# Patient Record
(demographics unavailable — no encounter records)

---

## 2025-02-26 NOTE — PHYSICAL EXAM
[Normal Appearance] : normal appearance [Well Groomed] : well groomed [General Appearance - In No Acute Distress] : no acute distress [Edema] : no peripheral edema [Respiration, Rhythm And Depth] : normal respiratory rhythm and effort [Exaggerated Use Of Accessory Muscles For Inspiration] : no accessory muscle use [Abdomen Soft] : soft [Abdomen Tenderness] : non-tender [Costovertebral Angle Tenderness] : no ~M costovertebral angle tenderness [Normal Station and Gait] : the gait and station were normal for the patient's age [] : no rash [No Focal Deficits] : no focal deficits [Oriented To Time, Place, And Person] : oriented to person, place, and time [Affect] : the affect was normal [Mood] : the mood was normal [No Palpable Adenopathy] : no palpable adenopathy [Chaperone Declined] : Patient declined chaperone [de-identified] : deferred for GYN

## 2025-02-26 NOTE — END OF VISIT
[Time Spent: ___ minutes] : I have spent [unfilled] minutes of time on the encounter which excludes teaching and separately reported services. [4. Prevent psychological harm to patient or others] : Reason - Prevent psychological harm to patient or others [FreeTextEntry3] : Patient notes was transcribed by medical scribjaret Marsh  under the supervision of Dr. Vasquez. And I have   reviewed the patient's chart and agree that it aligns with my medical decisions.  no

## 2025-02-26 NOTE — HISTORY OF PRESENT ILLNESS
[Urinary Frequency] : urinary frequency [Nocturia] : nocturia [FreeTextEntry1] : 78-year-old female patient presents for initial evaluation of renal stone.  Patient reports the finding of blood in the urine during her routine blood work and Her PCP Dr Erica Downs sent her for RBUS which revealed left renal stone. She has no flank pain. Denies fever, dysuria, nausea, vomiting or other constitutional symptoms  She takes long walks for activity, and She is retired currently She presents accompanied by her .  All past and present data reviewed 11/2024 RBUS [RR] = normal right kidney, left kidney 6mm stone, no hydronephrosis // Bladder volume = 284cc, PVR= 38cc   11/2024 BMP BUN=19, Cr = 0.7, GFR =73cc  02/2025 UA = Trace blood  [Weak Stream] : no weak stream [Dysuria] : no dysuria

## 2025-02-26 NOTE — ASSESSMENT
[FreeTextEntry1] : 1.  Right  nephrolithiasis -- 6cm (11/2024 US) 2. ?? AMH --by history   Plan: - Discussed UA and Renal Bladder US results with patient  - Discussed the low sensitivity and specificity of US in the investigation of stone disease as compared to CT scan and KUB   - Stone protocol CT now - KUB now - Urine microscopy and urine culture now -- will call patient if clinically indicated   - Return 4-6 weeks  - Patient and her brother have no additional questions or concerns at this time

## 2025-04-01 NOTE — PHYSICAL EXAM
[Normal Appearance] : normal appearance [Well Groomed] : well groomed [General Appearance - In No Acute Distress] : no acute distress [Edema] : no peripheral edema [Respiration, Rhythm And Depth] : normal respiratory rhythm and effort [Exaggerated Use Of Accessory Muscles For Inspiration] : no accessory muscle use [Abdomen Soft] : soft [Abdomen Tenderness] : non-tender [Costovertebral Angle Tenderness] : no ~M costovertebral angle tenderness [Normal Station and Gait] : the gait and station were normal for the patient's age [] : no rash [No Focal Deficits] : no focal deficits [Oriented To Time, Place, And Person] : oriented to person, place, and time [Affect] : the affect was normal [Mood] : the mood was normal [No Palpable Adenopathy] : no palpable adenopathy [Chaperone Declined] : Patient declined chaperone [de-identified] : deferred for GYN

## 2025-04-01 NOTE — ASSESSMENT
[FreeTextEntry1] : 1.  Left nephrolithiasis -- 5-6mm LMP (03/2025 CT and KUB) 2. AMH -- not clinically demonstrated   Plan: - Discussed Urine microscopy, UCx, KUB and CT scan results with patient  - Discussed the very good probability of spontaneously passing stones not more than 4mm in size. However, for every mm above 4 the chance of passing stones is decreased by 10% - Discussed the probability of passing stones also depends on the position of the stone in the kidney. Drinking water or any other clear fluid, physical activity and medications such as flomax can help pass smaller stones  - Discussed the risks and possible complications of renal stones including but not limited to renal colic, UTI and sepsis. - Discussed SDM on the approach to her stone disease, we reviewed Observation vs Left ESWL. Patient and her  are leaning to ESWL but undecided at this time, they will call office with their decision. - Regarding Left ESWL -- discussed the methodology, risks/benefits, complications, use of anesthesia, use of antibiotics, recovery and post procedure follow up.  - Provided with booklet on renal stones to review  - Patient is vaccinated for Covid - Moderna 2022 - informed consent obtained. Risks and benefits discussed including but not limited to infection, bleeding, sepsis, steinstrasse with ureteroscopy and stents, stephanie-nephric hematoma, post op retention, unforeseen complications such as MI, CVA, DVT, PE, alternatives , post op course, risks, non-vis of stone, expectations, post op course.,  Discussed the patient safety preop, Intra-Op, postop. - Patient and her spouse have no additional questions or concerns at this time

## 2025-04-01 NOTE — HISTORY OF PRESENT ILLNESS
[Urinary Frequency] : urinary frequency [Nocturia] : nocturia [FreeTextEntry1] : 78-year-old female patient presents for follow up evaluation of Left nephrolithiasis and ??AMH. Here today to review KUB, CT and AMH workup results.  Patient reports doing well.  She has no flank pain. Denies fever, dysuria, nausea, vomiting or other constitutional symptoms  She takes long walks for activity, and She is retired currently She presents accompanied by her .  All past and present data reviewed 11/2024 RBUS [RR] = normal right kidney, left kidney 6mm stone, no hydronephrosis // Bladder volume = 284cc, PVR= 38cc   11/2024 BMP BUN=19, Cr = 0.7, GFR =73cc  02/2025 UA = Trace blood  02/2025 Urine micro = Neg 02/2025 Urine Cx = Neg  03/2025 Stone protocol CT [[facility]] = 5mm left midpole renal stone. no hydronephrosis  03/2025 KUB = 6mm left mid pole calculus [Weak Stream] : no weak stream [Dysuria] : no dysuria

## 2025-04-01 NOTE — END OF VISIT
[Time Spent: ___ minutes] : I have spent [unfilled] minutes of time on the encounter which excludes teaching and separately reported services. [4. Prevent psychological harm to patient or others] : Reason - Prevent psychological harm to patient or others [FreeTextEntry3] : Patient notes was transcribed by medical scribjaret Marsh  under the supervision of Dr. Vasquez. And I have   reviewed the patient's chart and agree that it aligns with my medical decisions.

## 2025-07-23 NOTE — HISTORY OF PRESENT ILLNESS
[Urinary Frequency] : urinary frequency [Nocturia] : nocturia [FreeTextEntry1] : 78-year-old female patient presents for follow up evaluation of Left nephrolithiasis . Patient is status post left ESWL and reports feeling well. Patient reports that she had a KUB done post op at [facility] but the report was not sent yet. Verbal report by patient states no stone identified (07/18/2025).  She has no flank pain. Denies fever, dysuria, nausea, vomiting or other constitutional symptoms. Patient is accompanied by her spouse.  All past and present data reviewed 11/2024 RBUS [RR] = normal right kidney, left kidney 6mm stone, no hydronephrosis // Bladder volume = 284cc, PVR= 38cc   11/2024 BMP BUN=19, Cr = 0.7, GFR =73cc  07/2025 UA= NEG 02/2025 UA = Trace blood  02/2025 Urine micro = Neg 02/2025 Urine Cx = Neg  [facility] = 5mm left midpole renal stone. no hydronephrosis  03/2025 KUB = 6mm left mid pole calculus [Weak Stream] : no weak stream [Dysuria] : no dysuria

## 2025-07-23 NOTE — END OF VISIT
[Time Spent: ___ minutes] : I have spent [unfilled] minutes of time on the encounter which excludes teaching and separately reported services. [4. Prevent psychological harm to patient or others] : Reason - Prevent psychological harm to patient or others [FreeTextEntry3] : Patient notes was transcribed by devonte Haas  under the supervision of Dr. Vasquez. And I have   reviewed the patient's chart and agree that it aligns with my medical decisions.

## 2025-07-23 NOTE — HISTORY OF PRESENT ILLNESS
[Urinary Frequency] : urinary frequency [Nocturia] : nocturia [FreeTextEntry1] : 78-year-old female patient presents for follow up evaluation of Left nephrolithiasis . Patient is status post left ESWL and reports feeling well. Patient reports that she had a KUB done post op at [facility] but the report was not sent yet. Verbal report by patient states no stone identified (07/18/2025).  She has no flank pain. Denies fever, dysuria, nausea, vomiting or other constitutional symptoms. Patient is accompanied by her spouse.  All past and present data reviewed 11/2024 RBUS [RR] = normal right kidney, left kidney 6mm stone, no hydronephrosis // Bladder volume = 284cc, PVR= 38cc   11/2024 BMP BUN=19, Cr = 0.7, GFR =73cc  07/2025 UA= NEG 02/2025 UA = Trace blood  02/2025 Urine micro = Neg 02/2025 Urine Cx = Neg  [facility] = 5mm left midpole renal stone. no hydronephrosis  03/2025 KUB = 6mm left mid pole calculus [Weak Stream] : no weak stream [Dysuria] : no dysuria

## 2025-07-23 NOTE — PHYSICAL EXAM
[Normal Appearance] : normal appearance [Well Groomed] : well groomed [General Appearance - In No Acute Distress] : no acute distress [Edema] : no peripheral edema [Respiration, Rhythm And Depth] : normal respiratory rhythm and effort [Exaggerated Use Of Accessory Muscles For Inspiration] : no accessory muscle use [Abdomen Soft] : soft [Abdomen Tenderness] : non-tender [Costovertebral Angle Tenderness] : no ~M costovertebral angle tenderness [Normal Station and Gait] : the gait and station were normal for the patient's age [] : no rash [No Focal Deficits] : no focal deficits [Oriented To Time, Place, And Person] : oriented to person, place, and time [Affect] : the affect was normal [Mood] : the mood was normal [No Palpable Adenopathy] : no palpable adenopathy [de-identified] : deferred for GYN

## 2025-07-23 NOTE — ASSESSMENT
[FreeTextEntry1] : 1.  Left nephrolithiasis --status post left ESWL 2. AMH -- not clinically demonstrated   Plan: -Discussed that a low sodium diet will lessen the chance of the formation of stones.  -RTO PRN - Patient and her spouse have no additional questions or concerns at this time

## 2025-07-23 NOTE — PHYSICAL EXAM
[Normal Appearance] : normal appearance [Well Groomed] : well groomed [General Appearance - In No Acute Distress] : no acute distress [Edema] : no peripheral edema [Respiration, Rhythm And Depth] : normal respiratory rhythm and effort [Exaggerated Use Of Accessory Muscles For Inspiration] : no accessory muscle use [Abdomen Soft] : soft [Abdomen Tenderness] : non-tender [Costovertebral Angle Tenderness] : no ~M costovertebral angle tenderness [Normal Station and Gait] : the gait and station were normal for the patient's age [] : no rash [No Focal Deficits] : no focal deficits [Oriented To Time, Place, And Person] : oriented to person, place, and time [Affect] : the affect was normal [Mood] : the mood was normal [No Palpable Adenopathy] : no palpable adenopathy [de-identified] : deferred for GYN